# Patient Record
Sex: FEMALE | Race: WHITE | Employment: FULL TIME | URBAN - METROPOLITAN AREA
[De-identification: names, ages, dates, MRNs, and addresses within clinical notes are randomized per-mention and may not be internally consistent; named-entity substitution may affect disease eponyms.]

---

## 2023-06-13 DIAGNOSIS — Z83.2 FAMILY HISTORY OF THALASSEMIA: ICD-10-CM

## 2023-06-13 DIAGNOSIS — R79.0 ABNORMAL SERUM IRON LEVEL: ICD-10-CM

## 2023-06-13 LAB
BASOPHILS # BLD: 0.1 K/UL (ref 0–0.2)
BASOPHILS NFR BLD: 0.7 %
DEPRECATED RDW RBC AUTO: 12 % (ref 12.4–15.4)
EOSINOPHIL # BLD: 0.3 K/UL (ref 0–0.6)
EOSINOPHIL NFR BLD: 3.1 %
FERRITIN SERPL IA-MCNC: 59.9 NG/ML (ref 15–150)
HCT VFR BLD AUTO: 40.9 % (ref 36–48)
HGB BLD-MCNC: 14.5 G/DL (ref 12–16)
LYMPHOCYTES # BLD: 2.9 K/UL (ref 1–5.1)
LYMPHOCYTES NFR BLD: 35.3 %
MCH RBC QN AUTO: 33.9 PG (ref 26–34)
MCHC RBC AUTO-ENTMCNC: 35.6 G/DL (ref 31–36)
MCV RBC AUTO: 95.2 FL (ref 80–100)
MONOCYTES # BLD: 0.5 K/UL (ref 0–1.3)
MONOCYTES NFR BLD: 5.8 %
NEUTROPHILS # BLD: 4.5 K/UL (ref 1.7–7.7)
NEUTROPHILS NFR BLD: 55.1 %
PLATELET # BLD AUTO: 283 K/UL (ref 135–450)
PMV BLD AUTO: 9.9 FL (ref 5–10.5)
RBC # BLD AUTO: 4.3 M/UL (ref 4–5.2)
TRANSFERRIN SERPL-MCNC: 357 MG/DL (ref 200–360)
WBC # BLD AUTO: 8.2 K/UL (ref 4–11)

## 2023-06-14 NOTE — RESULT ENCOUNTER NOTE
Labs are normal so far, although the transferrin is on the upper limits of normal. This is reflection of your iron stores.  I am waiting on the last test which will likely be a few more days to come back and then we will decide how to proceed. - Dr. Corinne Stark

## 2023-06-15 PROBLEM — R59.0 LYMPHADENOPATHY OF RIGHT CERVICAL REGION: Status: ACTIVE | Noted: 2023-06-15

## 2023-06-15 PROBLEM — Z83.2 FAMILY HISTORY OF THALASSEMIA: Status: ACTIVE | Noted: 2023-06-15

## 2023-06-15 PROBLEM — R79.0 ABNORMAL SERUM IRON LEVEL: Status: ACTIVE | Noted: 2023-06-15

## 2023-06-20 LAB
HGB FRACT BLD ELPH-IMP: NORMAL
HGB FRACT BLD ELPH-IMP: NORMAL

## 2024-07-08 ENCOUNTER — OFFICE VISIT (OUTPATIENT)
Dept: PRIMARY CARE CLINIC | Age: 34
End: 2024-07-08
Payer: COMMERCIAL

## 2024-07-08 VITALS
BODY MASS INDEX: 23.3 KG/M2 | SYSTOLIC BLOOD PRESSURE: 99 MMHG | TEMPERATURE: 98 F | WEIGHT: 140 LBS | HEART RATE: 73 BPM | DIASTOLIC BLOOD PRESSURE: 52 MMHG | OXYGEN SATURATION: 100 %

## 2024-07-08 DIAGNOSIS — Z00.00 ROUTINE PHYSICAL EXAMINATION: Primary | ICD-10-CM

## 2024-07-08 PROBLEM — R79.0 ABNORMAL SERUM IRON LEVEL: Status: RESOLVED | Noted: 2023-06-15 | Resolved: 2024-07-08

## 2024-07-08 PROBLEM — Z83.2 FAMILY HISTORY OF THALASSEMIA: Status: RESOLVED | Noted: 2023-06-15 | Resolved: 2024-07-08

## 2024-07-08 PROCEDURE — 99395 PREV VISIT EST AGE 18-39: CPT | Performed by: FAMILY MEDICINE

## 2024-07-08 SDOH — ECONOMIC STABILITY: INCOME INSECURITY: HOW HARD IS IT FOR YOU TO PAY FOR THE VERY BASICS LIKE FOOD, HOUSING, MEDICAL CARE, AND HEATING?: NOT HARD AT ALL

## 2024-07-08 SDOH — ECONOMIC STABILITY: FOOD INSECURITY: WITHIN THE PAST 12 MONTHS, THE FOOD YOU BOUGHT JUST DIDN'T LAST AND YOU DIDN'T HAVE MONEY TO GET MORE.: NEVER TRUE

## 2024-07-08 SDOH — ECONOMIC STABILITY: HOUSING INSECURITY
IN THE LAST 12 MONTHS, WAS THERE A TIME WHEN YOU DID NOT HAVE A STEADY PLACE TO SLEEP OR SLEPT IN A SHELTER (INCLUDING NOW)?: NO

## 2024-07-08 SDOH — ECONOMIC STABILITY: FOOD INSECURITY: WITHIN THE PAST 12 MONTHS, YOU WORRIED THAT YOUR FOOD WOULD RUN OUT BEFORE YOU GOT MONEY TO BUY MORE.: NEVER TRUE

## 2024-07-08 ASSESSMENT — ENCOUNTER SYMPTOMS
VOMITING: 0
NAUSEA: 0
SHORTNESS OF BREATH: 0
DIARRHEA: 0
ABDOMINAL PAIN: 0

## 2024-07-08 ASSESSMENT — PATIENT HEALTH QUESTIONNAIRE - PHQ9
SUM OF ALL RESPONSES TO PHQ QUESTIONS 1-9: 0
2. FEELING DOWN, DEPRESSED OR HOPELESS: NOT AT ALL
SUM OF ALL RESPONSES TO PHQ QUESTIONS 1-9: 0
SUM OF ALL RESPONSES TO PHQ9 QUESTIONS 1 & 2: 0
1. LITTLE INTEREST OR PLEASURE IN DOING THINGS: NOT AT ALL

## 2024-07-08 NOTE — PROGRESS NOTES
[facility]  [facility]  [address] [place] [id number]  Dept: [phone]  Dept Fax: [phone]     7/8/2024      Angela Carr   1990     Chief Complaint   Patient presents with    Annual Exam       HPI    Pt comes in today for annual physical.     Currently pregnant - 22 weeks. Uncomplicated pregnancy.     Noted to have accessory breast tissue to right axilla early in pregnancy. Mammo/US confirmed. Monitoring.     On PNV with iron.     PHQ-9 Total Score: 0 (7/8/2024 10:32 AM)       Prior to Visit Medications    Medication Sig Taking? Authorizing Provider   acyclovir (ZOVIRAX) 400 MG tablet Take 1 tablet by mouth every 4 hours (while awake)  ProviderDorota MD   norgestimate-ethinyl estradiol (ORTHO-CYCLEN) 0.25-35 MG-MCG per tablet Take 1 tablet by mouth daily  Provider, MD Dorota        Past Medical History:   Diagnosis Date    H/O cold sores         Social History     Tobacco Use    Smoking status: Never    Smokeless tobacco: Never   Substance Use Topics    Alcohol use: Yes     Alcohol/week: 5.0 standard drinks of alcohol     Types: 5 Glasses of wine per week    Drug use: No        Past Surgical History:   Procedure Laterality Date    APPENDECTOMY  2006    TONSILLECTOMY          No Known Allergies     Family History   Problem Relation Age of Onset    No Known Problems Mother     No Known Problems Father     Other Brother         seizure        Patient's past medical history, surgical history, family history, medications, and allergies  were all reviewed and updated as appropriate today.    Review of Systems   Constitutional:  Negative for chills, fever and unexpected weight change.   Respiratory:  Negative for shortness of breath.    Cardiovascular:  Negative for chest pain.   Gastrointestinal:  Negative for abdominal pain, diarrhea, nausea and vomiting.       BP (!) 99/52   Pulse 73   Temp 98 °F (36.7 °C)   Wt 63.5 kg (140 lb)   SpO2 100%